# Patient Record
Sex: FEMALE | Race: BLACK OR AFRICAN AMERICAN | NOT HISPANIC OR LATINO | Employment: OTHER | URBAN - METROPOLITAN AREA
[De-identification: names, ages, dates, MRNs, and addresses within clinical notes are randomized per-mention and may not be internally consistent; named-entity substitution may affect disease eponyms.]

---

## 2019-12-11 NOTE — PATIENT DISCUSSION
"""Discussed blepharitis diagnosis with patient. Educated patient on proper lid hygiene and the use of warm compresses.  """

## 2020-12-17 NOTE — PATIENT DISCUSSION
"""Follow drusen without treatment. Call if vision or distortion increases.  Will continue to monitor. """

## 2021-12-09 NOTE — PATIENT DISCUSSION
"""Follow drusen without treatment. Call if vision or distortion increases. Will continue to monitor.  ""."

## 2021-12-16 NOTE — PATIENT DISCUSSION
Instructed to call immediately if any new distortion, blurring, decreased vision or eye pain.
NOT VISUALLY SIGNIFICANT: Informed patient that their cataract is not visually significant or does not meet the criteria for cataract surgery. Recommend attention to cataract symptoms monitoring by regular examinations. Patient instructed to call with changes in vision.
Recommend artificial tears as needed.
Recommended observation.
none

## 2022-01-11 ENCOUNTER — NEW PATIENT (OUTPATIENT)
Dept: URBAN - METROPOLITAN AREA CLINIC 13 | Facility: CLINIC | Age: 27
End: 2022-01-11

## 2022-01-11 DIAGNOSIS — H52.13: ICD-10-CM

## 2022-01-11 PROCEDURE — 92015 DETERMINE REFRACTIVE STATE: CPT

## 2022-01-11 PROCEDURE — 92004 COMPRE OPH EXAM NEW PT 1/>: CPT

## 2022-01-11 PROCEDURE — 92310C CONTACT LENS 75

## 2022-01-11 ASSESSMENT — VISUAL ACUITY
OD_SC: 20/300
OD_CC: 20/30-1
OU_SC: 20/300
OS_CC: 20/30-1
OU_CC: 20/25-1
OS_SC: 20/300

## 2022-01-11 ASSESSMENT — KERATOMETRY
OD_AXISANGLE_DEGREES: 11
OD_AXISANGLE2_DEGREES: 101
OS_K2POWER_DIOPTERS: 44.75
OD_K1POWER_DIOPTERS: 44.00
OS_AXISANGLE_DEGREES: 163
OD_K2POWER_DIOPTERS: 47.25
OS_K1POWER_DIOPTERS: 42.25
OS_AXISANGLE2_DEGREES: 73

## 2022-01-11 ASSESSMENT — TONOMETRY
OS_IOP_MMHG: 09
OD_IOP_MMHG: 10

## 2023-04-12 ASSESSMENT — KERATOMETRY
OD_K1POWER_DIOPTERS: 42.75
OS_K2POWER_DIOPTERS: 45.75
OD_K2POWER_DIOPTERS: 46.25
OD_AXISANGLE2_DEGREES: 109
OS_AXISANGLE_DEGREES: 168
OD_AXISANGLE_DEGREES: 19
OS_K1POWER_DIOPTERS: 42.50
OS_AXISANGLE2_DEGREES: 78

## 2023-04-13 ENCOUNTER — PREPPED CHART (OUTPATIENT)
Dept: URBAN - METROPOLITAN AREA CLINIC 13 | Facility: CLINIC | Age: 28
End: 2023-04-13